# Patient Record
Sex: FEMALE | Race: BLACK OR AFRICAN AMERICAN | NOT HISPANIC OR LATINO | URBAN - NONMETROPOLITAN AREA
[De-identification: names, ages, dates, MRNs, and addresses within clinical notes are randomized per-mention and may not be internally consistent; named-entity substitution may affect disease eponyms.]

---

## 2019-06-05 ENCOUNTER — IMPORTED ENCOUNTER (OUTPATIENT)
Dept: URBAN - NONMETROPOLITAN AREA CLINIC 1 | Facility: CLINIC | Age: 69
End: 2019-06-05

## 2019-06-05 PROBLEM — Z96.1: Noted: 2019-06-05

## 2019-06-05 PROBLEM — E11.3593: Noted: 2019-06-05

## 2019-06-05 PROBLEM — H04.123: Noted: 2019-06-05

## 2019-06-05 PROBLEM — H20.012: Noted: 2019-06-24

## 2019-06-05 PROBLEM — H40.1131: Noted: 2019-06-05

## 2019-06-05 PROBLEM — H53.9: Noted: 2019-06-05

## 2019-06-05 PROBLEM — H20.013: Noted: 2019-06-05

## 2019-06-05 PROCEDURE — 99212 OFFICE O/P EST SF 10 MIN: CPT

## 2019-06-05 NOTE — PATIENT DISCUSSION
Iritis OS-Discussed findings of exam in detail with the patient.-Discussed the chronic nature of this disease and recurrent flare-ups. -prescribed topical steroid drops patient instructed on usage and side effects. Visual Disturbance-s/p stroke May 2018 pt c/o blurry decreased vision-order VF SF 24-2 pt still needs DM w/ hx of PDR OU-Stressed the importance of keeping blood sugars under control and regular visits with PCP. -Explained the possible effects of poorly controlled diabetes and the damage that diabetes can cause to ocular health. -Pt instructed to contact our office with any vision changesCOAG-IOP 22:13-Continue Timolol 0.5% OU BID and Latanoprost OU qhs as directed. -Stressed importance of compliance. PC IOLOU-stable continue to monitor6/19/18  IOP 21:17  Va cc OD 20/30+ OS 20/309/11/18 IOP 22:20 Va cc OD 20/30 OS 20/30103010/23/18 IOP 22:16 Va cc OD 20/30+2 OS 20/40+2RTC:  n/a VF SF 24-2 2 Wk F/U Iritis OS

## 2019-06-20 ENCOUNTER — IMPORTED ENCOUNTER (OUTPATIENT)
Dept: URBAN - NONMETROPOLITAN AREA CLINIC 1 | Facility: CLINIC | Age: 69
End: 2019-06-20

## 2019-06-20 PROCEDURE — 99213 OFFICE O/P EST LOW 20 MIN: CPT

## 2019-06-20 NOTE — PATIENT DISCUSSION
Iritis OS quiet today-Discussed findings of exam in detail with the patient.-Discussed the chronic nature of this disease and recurrent flare-ups. -prescribed Prednisolone bid OS drops patient instructed on usage and side effects. Visual Disturbance-s/p stroke May 2018 pt c/o blurry decreased vision-order VF SF 24-2 pt still needs DM w/ hx of PDR OU-Stressed the importance of keeping blood sugars under control and regular visits with PCP. -Explained the possible effects of poorly controlled diabetes and the damage that diabetes can cause to ocular health. -Pt instructed to contact our office with any vision changesCOAG-IOP 19:17 06/24/19-Continue Timolol 0.5% OU BID and Latanoprost OU qhs as directed. -Stressed importance of compliance. PC IOLOU-stable continue to monitorRTC:  n/a VF SF 24-2 1 MO F/U Iritis OS

## 2019-08-13 ENCOUNTER — IMPORTED ENCOUNTER (OUTPATIENT)
Dept: URBAN - NONMETROPOLITAN AREA CLINIC 1 | Facility: CLINIC | Age: 69
End: 2019-08-13

## 2019-08-13 PROCEDURE — 92012 INTRM OPH EXAM EST PATIENT: CPT

## 2019-08-13 NOTE — PATIENT DISCUSSION
Iritis OS quiet today-Discussed findings of exam in detail with the patient.-Discussed the chronic nature of this disease and recurrent flare-ups. -prescribed Prednisolone bid OS drops patient instructed on usage and side effects. Visual Disturbance-s/p stroke May 2018 pt c/o blurry decreased vision-order VF SF 24-2 pt still needs DM w/ hx of PDR OU-Stressed the importance of keeping blood sugars under control and regular visits with PCP. -Explained the possible effects of poorly controlled diabetes and the damage that diabetes can cause to ocular health. -Pt instructed to contact our office with any vision changesCOAG-IOP 19:17 06/24/19-Continue Timolol 0.5% OU BID and Latanoprost OU qhs as directed. -Stressed importance of compliance. PC IOLOU-stable continue to monitorRTC:  n/a VF SF 24-2 2 MO F/U Iritis OS

## 2019-09-18 ENCOUNTER — IMPORTED ENCOUNTER (OUTPATIENT)
Dept: URBAN - NONMETROPOLITAN AREA CLINIC 1 | Facility: CLINIC | Age: 69
End: 2019-09-18

## 2019-09-18 PROCEDURE — 92083 EXTENDED VISUAL FIELD XM: CPT

## 2019-09-18 PROCEDURE — 99212 OFFICE O/P EST SF 10 MIN: CPT

## 2019-09-18 NOTE — PATIENT DISCUSSION
Iritis OS injection-Discussed findings of exam in detail with the patient.-Discussed the chronic nature of this disease and recurrent flare-ups.-Increase Prednisolone to qid OS X10 days then decrease to bid OS  until seen in Nov. Patient instructed on usage and side effects. Visual Disturbance-s/p stroke May 2018 pt c/o blurry decreased vision-VF performed and reviewed w/pt DM w/ hx of PDR OU-Stressed the importance of keeping blood sugars under control and regular visits with PCP. -Explained the possible effects of poorly controlled diabetes and the damage that diabetes can cause to ocular health. -Pt instructed to contact our office with any vision changesCOAG-IOP 19:17 06/24/19-Continue Timolol 0.5% OU BID and Latanoprost OU qhs as directed. -Stressed importance of compliance. TAMIE CORDERO-stable continue to [facility] Nov F/U Iritis

## 2019-11-26 ENCOUNTER — IMPORTED ENCOUNTER (OUTPATIENT)
Dept: URBAN - NONMETROPOLITAN AREA CLINIC 1 | Facility: CLINIC | Age: 69
End: 2019-11-26

## 2019-11-26 PROCEDURE — 99213 OFFICE O/P EST LOW 20 MIN: CPT

## 2019-11-26 NOTE — PATIENT DISCUSSION
Iritis OS injection-Discussed findings of exam in detail with the patient.-Discussed the chronic nature of this disease and recurrent flare-ups. -D/C Prednisolone bid OS  at this time if flares up restart drop and pt is to be seen-recommend pt use refresh relieva for dry eyeVisual Disturbance-s/p stroke May 2018 pt c/o blurry decreased vision-VF performed and reviewed w/pt DM w/ hx of PDR OU-Stressed the importance of keeping blood sugars under control and regular visits with PCP. -Explained the possible effects of poorly controlled diabetes and the damage that diabetes can cause to ocular health. -Pt instructed to contact our office with any vision changesCOAG-IOP -Continue Timolol 0.5% OU BID and Latanoprost OU qhs as directed. -Stressed importance of compliance. PC IOLOU-stable continue to monitorRTC 2 Mo f/u Iritis OS

## 2020-01-09 ENCOUNTER — IMPORTED ENCOUNTER (OUTPATIENT)
Dept: URBAN - NONMETROPOLITAN AREA CLINIC 1 | Facility: CLINIC | Age: 70
End: 2020-01-09

## 2020-01-09 PROCEDURE — 99212 OFFICE O/P EST SF 10 MIN: CPT

## 2020-02-26 ENCOUNTER — IMPORTED ENCOUNTER (OUTPATIENT)
Dept: URBAN - NONMETROPOLITAN AREA CLINIC 1 | Facility: CLINIC | Age: 70
End: 2020-02-26

## 2020-02-26 PROBLEM — H52.13: Noted: 2020-02-26

## 2020-02-26 PROBLEM — H20.012: Noted: 2020-02-26

## 2020-02-26 PROBLEM — H04.123: Noted: 2020-02-26

## 2020-02-26 PROBLEM — H52.4: Noted: 2020-02-26

## 2020-02-26 PROBLEM — H40.1131: Noted: 2020-02-26

## 2020-02-26 PROBLEM — Z96.1: Noted: 2020-02-26

## 2020-02-26 PROBLEM — E11.3593: Noted: 2020-02-26

## 2020-02-26 PROCEDURE — 99213 OFFICE O/P EST LOW 20 MIN: CPT

## 2020-02-26 PROCEDURE — 92015 DETERMINE REFRACTIVE STATE: CPT

## 2020-02-26 NOTE — PATIENT DISCUSSION
Myopia-Discussed diagnosis with patient. -Explained that people who are myopic are at a higher risk for developing RD/RT and reviewed associated S&S.-Pt to contact our office if symptoms develop. Updated spec Rx given. Recommend lens that will provide comfort as well as protect safety and health of eyes. Iritis OS injection-Discussed findings of exam in detail with the patient.-Discussed the chronic nature of this disease and recurrent flare-ups.-Cont Prednisolone prn OS at this time-recommend pt use refresh relieva for dry eyeDM w/ hx of PDR OU-Stressed the importance of keeping blood sugars under control and regular visits with PCP. -Explained the possible effects of poorly controlled diabetes and the damage that diabetes can cause to ocular health. -Pt instructed to contact our office with any vision changes-Order OCT MACCOAG-IOP 20:21 02/26/20-Continue Timolol 0.5% OU BID and Latanoprost OU qhs as directed. -Stressed importance of compliance. PC IOLOU-stable continue to monitorRTC 12 Wk f/u Iritis IOP check OCT MAC

## 2020-05-21 ENCOUNTER — IMPORTED ENCOUNTER (OUTPATIENT)
Dept: URBAN - NONMETROPOLITAN AREA CLINIC 1 | Facility: CLINIC | Age: 70
End: 2020-05-21

## 2020-05-21 PROBLEM — Z96.1: Noted: 2020-05-21

## 2020-05-21 PROBLEM — E11.3593: Noted: 2020-05-21

## 2020-05-21 PROBLEM — H20.012: Noted: 2020-05-21

## 2020-05-21 PROBLEM — H40.1131: Noted: 2020-05-21

## 2020-05-21 PROBLEM — H04.123: Noted: 2020-05-21

## 2020-05-21 PROCEDURE — 92012 INTRM OPH EXAM EST PATIENT: CPT

## 2020-05-21 PROCEDURE — 92134 CPTRZ OPH DX IMG PST SGM RTA: CPT

## 2020-05-21 NOTE — PATIENT DISCUSSION
DM w/ hx of PDR OU-Stressed the importance of keeping blood sugars under control and regular visits with PCP. -Explained the possible effects of poorly controlled diabetes and the damage that diabetes can cause to ocular health. -Pt instructed to contact our office with any vision changes-Order OCT MACCOAG-IOP 14 OD 16 OS -Continue Timolol 0.5% OU BID and Latanoprost OU qhs as directed. -Stressed importance of compliance. PC IOLOU-stable continue to monitorIritis OS injection- improved -Discussed findings of exam in detail with the patient.-Discussed the chronic nature of this disease and recurrent flare-ups.-Cont Prednisolone prn OS at this time-recommend pt use refresh relieva for dry eyeRTC 4 months IOP check w/ OCT ONH

## 2020-08-28 NOTE — PATIENT DISCUSSION
Iritis OS injection-Discussed findings of exam in detail with the patient.-Discussed the chronic nature of this disease and recurrent flare-ups. - Decrease Prednisolone qd OS  at this time-recommend pt use refresh relieva for dry eyeVisual Disturbance-s/p stroke May 2018 pt c/o blurry decreased vision-VF performed and reviewed w/pt DM w/ hx of PDR OU-Stressed the importance of keeping blood sugars under control and regular visits with PCP. -Explained the possible effects of poorly controlled diabetes and the damage that diabetes can cause to ocular health. -Pt instructed to contact our office with any vision changesCOAG-IOP 16 OU 01/09/20-Continue Timolol 0.5% OU BID and Latanoprost OU qhs as directed. -Stressed importance of compliance. PC IOLOU-stable continue to monitorRTC 2 Mo f/u Iritis OS Results were discussed in clinic.  JAY AzulC

## 2021-01-22 ENCOUNTER — IMPORTED ENCOUNTER (OUTPATIENT)
Dept: URBAN - NONMETROPOLITAN AREA CLINIC 1 | Facility: CLINIC | Age: 71
End: 2021-01-22

## 2021-01-22 PROBLEM — H40.1131: Noted: 2021-01-22

## 2021-01-22 PROBLEM — H04.123: Noted: 2021-01-22

## 2021-01-22 PROBLEM — Z96.1: Noted: 2021-01-22

## 2021-01-22 PROBLEM — E11.3593: Noted: 2021-01-22

## 2021-01-22 PROCEDURE — 92133 CPTRZD OPH DX IMG PST SGM ON: CPT

## 2021-01-22 PROCEDURE — 92014 COMPRE OPH EXAM EST PT 1/>: CPT

## 2021-01-22 NOTE — PATIENT DISCUSSION
COAG-IOP stable ou-Continue Timolol 0.5% OU BID -cont Latanoprost OU qhs -monitor for iop and vf changesPC IOLOU-stable continue to monitorDM s DR-Stressed the importance of keeping blood sugars under control blood pressure under control and weight normalization and regular visits with PCP. -Explained the possible effects of poorly controlled diabetes and the damage that diabetes can cause to ocular health. -Patient to check HgbA1C.-Pt instructed to contact our office with any vision changes.

## 2021-04-05 ENCOUNTER — IMPORTED ENCOUNTER (OUTPATIENT)
Dept: URBAN - NONMETROPOLITAN AREA CLINIC 1 | Facility: CLINIC | Age: 71
End: 2021-04-05

## 2021-04-05 PROBLEM — H04.123: Noted: 2021-04-05

## 2021-04-05 PROBLEM — Z96.1: Noted: 2021-04-05

## 2021-04-05 PROBLEM — E11.3593: Noted: 2021-04-05

## 2021-04-05 PROBLEM — H40.1131: Noted: 2021-04-05

## 2021-04-05 PROBLEM — H18.831: Noted: 2021-04-05

## 2021-04-05 PROCEDURE — 92012 INTRM OPH EXAM EST PATIENT: CPT

## 2021-04-05 NOTE — PATIENT DISCUSSION
corneal erosion odsmalleducate ptstart pf qid od x 1wkCOAG-IOP stable ou-Continue Timolol 0.5% OU BID -cont Latanoprost OU qhs -monitor for iop and vf changesPC IOLOU-stable continue to monitorDM s DR-Stressed the importance of keeping blood sugars under control blood pressure under control and weight normalization and regular visits with PCP. -Explained the possible effects of poorly controlled diabetes and the damage that diabetes can cause to ocular health. -Patient to check HgbA1C.-Pt instructed to contact our office with any vision changes.

## 2021-09-07 ENCOUNTER — IMPORTED ENCOUNTER (OUTPATIENT)
Dept: URBAN - NONMETROPOLITAN AREA CLINIC 1 | Facility: CLINIC | Age: 71
End: 2021-09-07

## 2021-09-07 PROBLEM — H04.123: Noted: 2021-09-07

## 2021-09-07 PROBLEM — Z96.1: Noted: 2021-09-07

## 2021-09-07 PROBLEM — H40.1131: Noted: 2021-09-07

## 2021-09-07 PROBLEM — E11.3593: Noted: 2021-09-07

## 2021-09-07 PROCEDURE — 99213 OFFICE O/P EST LOW 20 MIN: CPT

## 2021-09-07 PROCEDURE — 92083 EXTENDED VISUAL FIELD XM: CPT

## 2021-09-07 NOTE — PATIENT DISCUSSION
COAG-IOP stable ou-Continue Timolol 0.5% OU BID -cont Latanoprost OU qhs -vf today stable ou-monitor for iop and vf changesPC IOLOU-stable continue to monitorDM s DR-Stressed the importance of keeping blood sugars under control blood pressure under control and weight normalization and regular visits with PCP. -Explained the possible effects of poorly controlled diabetes and the damage that diabetes can cause to ocular health. -Patient to check HgbA1C.-Pt instructed to contact our office with any vision changes.

## 2022-02-02 ENCOUNTER — EMERGENCY VISIT (OUTPATIENT)
Dept: RURAL CLINIC 1 | Facility: CLINIC | Age: 72
End: 2022-02-02

## 2022-02-02 DIAGNOSIS — H40.1131: ICD-10-CM

## 2022-02-02 PROCEDURE — 99213 OFFICE O/P EST LOW 20 MIN: CPT

## 2022-02-02 ASSESSMENT — TONOMETRY
OS_IOP_MMHG: 22
OD_IOP_MMHG: 23

## 2022-02-02 ASSESSMENT — VISUAL ACUITY
OD_CC: 20/40
OS_CC: 20/50

## 2022-02-23 ENCOUNTER — FOLLOW UP (OUTPATIENT)
Dept: RURAL CLINIC 1 | Facility: CLINIC | Age: 72
End: 2022-02-23

## 2022-02-23 DIAGNOSIS — H40.1131: ICD-10-CM

## 2022-02-23 PROCEDURE — 92083 EXTENDED VISUAL FIELD XM: CPT

## 2022-02-23 PROCEDURE — 99213 OFFICE O/P EST LOW 20 MIN: CPT

## 2022-02-23 ASSESSMENT — TONOMETRY
OS_IOP_MMHG: 13
OD_IOP_MMHG: 13

## 2022-04-17 ASSESSMENT — VISUAL ACUITY
OD_SC: 20/30+3
OD_SC: 20/50
OD_SC: 20/25+4
OD_SC: 20/30-2
OS_SC: 20/30
OS_SC: 20/50
OS_SC: 20/30
OS_SC: 20/40
OD_SC: 20/30
OD_SC: 20/30+4
OS_SC: 20/30+3
OS_SC: 20/30
OS_SC: 20/30
OD_SC: 20/40
OS_SC: 20/30+2
OS_SC: 20/30
OD_SC: 20/30
OD_SC: 20/30+4
OD_SC: 20/40
OD_SC: 20/20
OS_SC: 20/30+3
OS_SC: 20/30

## 2022-04-17 ASSESSMENT — TONOMETRY
OS_IOP_MMHG: 21
OD_IOP_MMHG: 20
OS_IOP_MMHG: 15
OS_IOP_MMHG: 19
OS_IOP_MMHG: 13
OD_IOP_MMHG: 20
OD_IOP_MMHG: 15
OS_IOP_MMHG: 17
OD_IOP_MMHG: 14
OS_IOP_MMHG: 16
OD_IOP_MMHG: 22
OD_IOP_MMHG: 15
OS_IOP_MMHG: 16
OS_IOP_MMHG: 16
OD_IOP_MMHG: 14
OS_IOP_MMHG: 16
OD_IOP_MMHG: 16
OS_IOP_MMHG: 16
OD_IOP_MMHG: 16
OS_IOP_MMHG: 11
OD_IOP_MMHG: 19
OD_IOP_MMHG: 16

## 2022-04-17 ASSESSMENT — PACHYMETRY
OD_CT_UM: 466; ADJ: VTHIN
OS_CT_UM: 465; ADJ: VTHIN
OD_CT_UM: 466; ADJ: VTHIN
OS_CT_UM: 465; ADJ: VTHIN
OS_CT_UM: 465; ADJ: VTHIN
OD_CT_UM: 466; ADJ: VTHIN
OD_CT_UM: 466; ADJ: VTHIN
OS_CT_UM: 465; ADJ: VTHIN
OS_CT_UM: 465; ADJ: VTHIN
OD_CT_UM: 466; ADJ: VTHIN
OS_CT_UM: 465; ADJ: VTHIN
OD_CT_UM: 466; ADJ: VTHIN
OD_CT_UM: 466; ADJ: VTHIN
OS_CT_UM: 465; ADJ: VTHIN
OS_CT_UM: 465; ADJ: VTHIN
OD_CT_UM: 466; ADJ: VTHIN
OD_CT_UM: 466; ADJ: VTHIN
OS_CT_UM: 465; ADJ: VTHIN

## 2023-04-28 ENCOUNTER — EMERGENCY VISIT (OUTPATIENT)
Dept: RURAL CLINIC 2 | Facility: CLINIC | Age: 73
End: 2023-04-28

## 2023-04-28 DIAGNOSIS — E11.3553: ICD-10-CM

## 2023-04-28 DIAGNOSIS — Z96.1: ICD-10-CM

## 2023-04-28 DIAGNOSIS — H16.223: ICD-10-CM

## 2023-04-28 DIAGNOSIS — H40.1131: ICD-10-CM

## 2023-04-28 PROCEDURE — 99213 OFFICE O/P EST LOW 20 MIN: CPT

## 2023-04-28 ASSESSMENT — TONOMETRY
OS_IOP_MMHG: 18
OD_IOP_MMHG: 16

## 2023-04-28 ASSESSMENT — VISUAL ACUITY
OD_CC: 20/50
OS_CC: 20/50

## 2023-10-16 ENCOUNTER — ESTABLISHED PATIENT (OUTPATIENT)
Dept: RURAL CLINIC 2 | Facility: CLINIC | Age: 73
End: 2023-10-16

## 2023-10-16 DIAGNOSIS — H16.223: ICD-10-CM

## 2023-10-16 DIAGNOSIS — H40.1131: ICD-10-CM

## 2023-10-16 DIAGNOSIS — E11.3553: ICD-10-CM

## 2023-10-16 DIAGNOSIS — Z96.1: ICD-10-CM

## 2023-10-16 PROCEDURE — 68761 CLOSE TEAR DUCT OPENING: CPT

## 2023-10-16 PROCEDURE — 99214 OFFICE O/P EST MOD 30 MIN: CPT | Mod: 25

## 2023-10-16 PROCEDURE — 92133 CPTRZD OPH DX IMG PST SGM ON: CPT

## 2023-10-16 ASSESSMENT — TONOMETRY
OD_IOP_MMHG: 16
OS_IOP_MMHG: 15

## 2023-10-16 ASSESSMENT — VISUAL ACUITY
OD_CC: 20/40
OS_CC: 20/30

## 2024-04-24 ENCOUNTER — FOLLOW UP (OUTPATIENT)
Dept: RURAL CLINIC 2 | Facility: CLINIC | Age: 74
End: 2024-04-24

## 2024-04-24 DIAGNOSIS — E11.3553: ICD-10-CM

## 2024-04-24 DIAGNOSIS — H16.223: ICD-10-CM

## 2024-04-24 DIAGNOSIS — H40.1131: ICD-10-CM

## 2024-04-24 DIAGNOSIS — Z96.1: ICD-10-CM

## 2024-04-24 DIAGNOSIS — H35.373: ICD-10-CM

## 2024-04-24 PROCEDURE — 68761 CLOSE TEAR DUCT OPENING: CPT

## 2024-04-24 PROCEDURE — 99214 OFFICE O/P EST MOD 30 MIN: CPT | Mod: 25

## 2024-04-24 PROCEDURE — 92134 CPTRZ OPH DX IMG PST SGM RTA: CPT

## 2024-04-24 ASSESSMENT — VISUAL ACUITY
OS_CC: 20/50
OD_CC: 20/50

## 2024-04-24 ASSESSMENT — TONOMETRY
OS_IOP_MMHG: 16
OD_IOP_MMHG: 16